# Patient Record
Sex: MALE | Race: WHITE | Employment: FULL TIME | URBAN - METROPOLITAN AREA
[De-identification: names, ages, dates, MRNs, and addresses within clinical notes are randomized per-mention and may not be internally consistent; named-entity substitution may affect disease eponyms.]

---

## 2020-07-07 ENCOUNTER — HOSPITAL ENCOUNTER (OUTPATIENT)
Dept: GENERAL RADIOLOGY | Age: 40
Discharge: HOME | End: 2020-07-07
Attending: INTERNAL MEDICINE
Payer: COMMERCIAL

## 2020-07-07 DIAGNOSIS — M54.42 ACUTE BACK PAIN WITH SCIATICA, LEFT: ICD-10-CM

## 2020-07-07 PROCEDURE — 72100 X-RAY EXAM L-S SPINE 2/3 VWS: CPT

## 2023-06-01 ENCOUNTER — HOSPITAL ENCOUNTER (OUTPATIENT)
Age: 43
Discharge: HOME | End: 2023-06-01
Payer: COMMERCIAL

## 2023-06-01 DIAGNOSIS — R05.9 COUGH, UNSPECIFIED TYPE: ICD-10-CM

## 2023-06-01 PROCEDURE — 71046 X-RAY EXAM CHEST 2 VIEWS: CPT

## 2024-12-22 ENCOUNTER — OFFICE VISIT (OUTPATIENT)
Age: 44
End: 2024-12-22

## 2024-12-22 VITALS
DIASTOLIC BLOOD PRESSURE: 79 MMHG | TEMPERATURE: 97.8 F | WEIGHT: 195 LBS | SYSTOLIC BLOOD PRESSURE: 123 MMHG | OXYGEN SATURATION: 98 % | RESPIRATION RATE: 16 BRPM | HEART RATE: 53 BPM

## 2024-12-22 DIAGNOSIS — B96.89 ACUTE BACTERIAL SINUSITIS: Primary | ICD-10-CM

## 2024-12-22 DIAGNOSIS — J01.90 ACUTE BACTERIAL SINUSITIS: Primary | ICD-10-CM

## 2024-12-22 DIAGNOSIS — R05.1 ACUTE COUGH: ICD-10-CM

## 2024-12-22 RX ORDER — DOXYCYCLINE HYCLATE 100 MG
100 TABLET ORAL 2 TIMES DAILY
Qty: 14 TABLET | Refills: 0 | Status: SHIPPED | OUTPATIENT
Start: 2024-12-22 | End: 2024-12-29

## 2024-12-22 RX ORDER — TIZANIDINE HYDROCHLORIDE 2 MG/1
2 CAPSULE, GELATIN COATED ORAL 3 TIMES DAILY PRN
COMMUNITY

## 2024-12-22 RX ORDER — DEXTROMETHORPHAN HYDROBROMIDE AND PROMETHAZINE HYDROCHLORIDE 15; 6.25 MG/5ML; MG/5ML
5 SYRUP ORAL 4 TIMES DAILY PRN
Qty: 118 ML | Refills: 0 | Status: SHIPPED | OUTPATIENT
Start: 2024-12-22 | End: 2024-12-29

## 2024-12-22 RX ORDER — KETOCONAZOLE 20 MG/G
CREAM TOPICAL
COMMUNITY

## 2024-12-22 RX ORDER — TRAZODONE HYDROCHLORIDE 50 MG/1
50 TABLET, FILM COATED ORAL NIGHTLY PRN
COMMUNITY
Start: 2024-11-16

## 2024-12-22 RX ORDER — ESOMEPRAZOLE MAGNESIUM 40 MG/1
CAPSULE, DELAYED RELEASE ORAL
COMMUNITY

## 2024-12-22 RX ORDER — ROSUVASTATIN CALCIUM 10 MG/1
10 TABLET, COATED ORAL DAILY
COMMUNITY

## 2024-12-22 RX ORDER — LOSARTAN POTASSIUM 50 MG/1
50 TABLET ORAL DAILY
COMMUNITY

## 2025-07-05 ENCOUNTER — OFFICE VISIT (OUTPATIENT)
Age: 45
End: 2025-07-05

## 2025-07-05 VITALS
RESPIRATION RATE: 18 BRPM | SYSTOLIC BLOOD PRESSURE: 130 MMHG | WEIGHT: 197 LBS | TEMPERATURE: 97.9 F | HEART RATE: 58 BPM | OXYGEN SATURATION: 98 % | DIASTOLIC BLOOD PRESSURE: 86 MMHG

## 2025-07-05 DIAGNOSIS — T78.40XA ALLERGIC REACTION, INITIAL ENCOUNTER: Primary | ICD-10-CM

## 2025-07-05 RX ORDER — PREDNISONE 5 MG/1
TABLET ORAL
Qty: 1 EACH | Refills: 0 | Status: SHIPPED | OUTPATIENT
Start: 2025-07-05

## 2025-07-05 RX ORDER — CLOPIDOGREL BISULFATE 75 MG/1
75 TABLET ORAL DAILY
COMMUNITY

## 2025-07-05 RX ORDER — LOSARTAN POTASSIUM AND HYDROCHLOROTHIAZIDE 12.5; 5 MG/1; MG/1
1 TABLET ORAL DAILY
COMMUNITY

## 2025-07-05 RX ORDER — DEXAMETHASONE SODIUM PHOSPHATE 10 MG/ML
10 INJECTION, SOLUTION INTRA-ARTICULAR; INTRALESIONAL; INTRAMUSCULAR; INTRAVENOUS; SOFT TISSUE ONCE
Status: COMPLETED | OUTPATIENT
Start: 2025-07-05 | End: 2025-07-05

## 2025-07-05 RX ORDER — EPINEPHRINE 0.3 MG/.3ML
0.3 INJECTION SUBCUTANEOUS
COMMUNITY

## 2025-07-05 RX ORDER — DIPHENHYDRAMINE HYDROCHLORIDE 50 MG/ML
50 INJECTION, SOLUTION INTRAMUSCULAR; INTRAVENOUS ONCE
Status: COMPLETED | OUTPATIENT
Start: 2025-07-05 | End: 2025-07-05

## 2025-07-05 RX ADMIN — DIPHENHYDRAMINE HYDROCHLORIDE 50 MG: 50 INJECTION, SOLUTION INTRAMUSCULAR; INTRAVENOUS at 12:39

## 2025-07-05 RX ADMIN — DEXAMETHASONE SODIUM PHOSPHATE 10 MG: 10 INJECTION, SOLUTION INTRA-ARTICULAR; INTRALESIONAL; INTRAMUSCULAR; INTRAVENOUS; SOFT TISSUE at 12:38

## 2025-07-05 NOTE — PATIENT INSTRUCTIONS
Thank you for visiting [facility] today.    -Rest and drink plenty of fluids  -Benadryl:  Take 25-50 mg of Benadryl at night time for next 3-5 days  -Zyrtec:  Take Zyrtec in the morning for a daytime antihistamine  -Prednisone Steroid prescription:  Preferably should be taken in the morning with food.  Please make sure NOT to take any NSAIDs (Aleve, Motrin, Advil, Ibuprofen, Diclofenac, Toradol) while on prednisone as it can increase risk of gastrointestinal bleeding    A survey will be sent shortly to your phone/email.  Please complete this so we may know how to better serve you in the future.     Please go immediately to the Emergency Department if you develop sensation of throat closing, facial swelling, difficulty swallowing, difficulty breathing, shortness of breath, chest pain, and uncontrolled fever above 100.4F.

## 2025-07-05 NOTE — PROGRESS NOTES
2025   Patient Status: Established patient  Anton Schafer (: 1980) is a 45 y.o. male, New patient, here for evaluation of the following chief complaint(s):  uvula swollen (Patient c/o with with swollen uvula since last night , home COVID test was negative.)          Assessment & Plan  Allergic reaction, initial encounter   Patient presents with allergic reaction.  I see no lore anaphylaxis at this time and the patient had a widely patent airway throughout his urgent care visit.  The exam did not reveal an ill-appearing patient, a toxic-appearing patient, vital signs that were significantly abnormal, a decreased O2 saturation or respiratory distress.  There is no suggestion of Robert Tera Syndrome or Toxic Epidermal Necrolysis.  The exam did not reveal dehydration, listlessness or lethargy.  The patient has been stable during the UC course.  The patient's symptoms and exam improved.  The patient will be treated on an outpatient basis based on relative stability and improvement of the patient's symptoms throughout urgent care visit.      - Dexamethasone and Benadryl given in clinic, tolerated well with improvement of symptoms after 30 minutes.  - States he has 6 EpiPen's at home and declined new prescription    The patient will be treated with a short course of steroids due to unclear exposure. Reviewed potential side effects and the patient verbalized understanding.    Extensive counseling was given to the patient in regard to worsening allergic symptoms and airway compromise. Strict ER precautions discussed and the patient verbalized understanding. The patient was instructed to follow up with primary physician in the near future for a recheck.     Discharge decision based on the following:  clinical impression is consistent with outpatient treatment; patient's exam is stable; patient's condition is stable.  Patient agrees to return if symptoms worse in any way, or develops new symptoms.